# Patient Record
Sex: MALE | Race: WHITE | NOT HISPANIC OR LATINO | URBAN - METROPOLITAN AREA
[De-identification: names, ages, dates, MRNs, and addresses within clinical notes are randomized per-mention and may not be internally consistent; named-entity substitution may affect disease eponyms.]

---

## 2018-06-20 ENCOUNTER — NEW PATIENT (OUTPATIENT)
Dept: URBAN - METROPOLITAN AREA CLINIC 64 | Facility: CLINIC | Age: 78
End: 2018-06-20
Payer: COMMERCIAL

## 2018-06-20 PROCEDURE — 92004 COMPRE OPH EXAM NEW PT 1/>: CPT | Performed by: OPHTHALMOLOGY

## 2018-06-20 ASSESSMENT — INTRAOCULAR PRESSURE
OS: 18
OD: 13

## 2018-06-20 ASSESSMENT — KERATOMETRY
OS: 43.44
OD: 42.73

## 2018-07-16 ENCOUNTER — FOLLOW UP ESTABLISHED (OUTPATIENT)
Dept: URBAN - METROPOLITAN AREA CLINIC 64 | Facility: CLINIC | Age: 78
End: 2018-07-16
Payer: COMMERCIAL

## 2018-07-16 DIAGNOSIS — H26.493 OTHER SECONDARY CATARACT, BILATERAL: ICD-10-CM

## 2018-07-16 PROCEDURE — 92012 INTRM OPH EXAM EST PATIENT: CPT | Performed by: OPHTHALMOLOGY

## 2018-07-16 ASSESSMENT — INTRAOCULAR PRESSURE
OS: 16
OD: 16

## 2018-07-16 ASSESSMENT — VISUAL ACUITY
OD: 20/20
OS: 20/20

## 2018-10-15 ENCOUNTER — FOLLOW UP ESTABLISHED (OUTPATIENT)
Dept: URBAN - METROPOLITAN AREA CLINIC 64 | Facility: CLINIC | Age: 78
End: 2018-10-15
Payer: COMMERCIAL

## 2018-10-15 PROCEDURE — 99213 OFFICE O/P EST LOW 20 MIN: CPT | Performed by: OPHTHALMOLOGY

## 2018-10-15 ASSESSMENT — VISUAL ACUITY
OD: 20/20
OS: 20/20

## 2018-10-15 ASSESSMENT — INTRAOCULAR PRESSURE
OD: 16
OS: 18

## 2019-10-14 ENCOUNTER — FOLLOW UP ESTABLISHED (OUTPATIENT)
Dept: URBAN - METROPOLITAN AREA CLINIC 64 | Facility: CLINIC | Age: 79
End: 2019-10-14
Payer: COMMERCIAL

## 2019-10-14 PROCEDURE — 92014 COMPRE OPH EXAM EST PT 1/>: CPT | Performed by: OPHTHALMOLOGY

## 2019-10-14 ASSESSMENT — KERATOMETRY
OS: 43.41
OD: 42.83

## 2019-10-14 ASSESSMENT — VISUAL ACUITY
OD: 20/25
OS: 20/20

## 2019-10-14 ASSESSMENT — INTRAOCULAR PRESSURE
OD: 14
OS: 15

## 2019-11-15 ENCOUNTER — SURGERY (OUTPATIENT)
Dept: URBAN - METROPOLITAN AREA SURGERY 42 | Facility: SURGERY | Age: 79
End: 2019-11-15
Payer: COMMERCIAL

## 2019-11-15 PROCEDURE — 66821 AFTER CATARACT LASER SURGERY: CPT | Performed by: OPHTHALMOLOGY

## 2019-11-25 ENCOUNTER — POST OP (OUTPATIENT)
Dept: URBAN - METROPOLITAN AREA CLINIC 64 | Facility: CLINIC | Age: 79
End: 2019-11-25

## 2019-11-25 PROCEDURE — 99024 POSTOP FOLLOW-UP VISIT: CPT | Performed by: OPTOMETRIST

## 2019-11-25 ASSESSMENT — VISUAL ACUITY
OS: 20/25
OD: 20/20

## 2019-11-25 ASSESSMENT — INTRAOCULAR PRESSURE
OS: 17
OD: 15

## 2019-12-12 ENCOUNTER — SURGERY (OUTPATIENT)
Dept: URBAN - METROPOLITAN AREA SURGERY 42 | Facility: SURGERY | Age: 79
End: 2019-12-12
Payer: COMMERCIAL

## 2019-12-12 PROCEDURE — 66821 AFTER CATARACT LASER SURGERY: CPT | Performed by: OPHTHALMOLOGY

## 2019-12-16 ENCOUNTER — POST OP (OUTPATIENT)
Dept: URBAN - METROPOLITAN AREA CLINIC 64 | Facility: CLINIC | Age: 79
End: 2019-12-16

## 2019-12-16 PROCEDURE — 99024 POSTOP FOLLOW-UP VISIT: CPT | Performed by: OPTOMETRIST

## 2019-12-16 ASSESSMENT — VISUAL ACUITY
OD: 20/20
OS: 20/25

## 2019-12-16 ASSESSMENT — INTRAOCULAR PRESSURE
OD: 12
OS: 13

## 2021-12-29 ENCOUNTER — OFFICE VISIT (OUTPATIENT)
Dept: URBAN - METROPOLITAN AREA CLINIC 64 | Facility: CLINIC | Age: 81
End: 2021-12-29
Payer: MEDICARE

## 2021-12-29 DIAGNOSIS — Z96.1 PRESENCE OF INTRAOCULAR LENS: ICD-10-CM

## 2021-12-29 DIAGNOSIS — H43.391 OTHER VITREOUS OPACITIES, RIGHT EYE: Primary | ICD-10-CM

## 2021-12-29 DIAGNOSIS — H04.562 STENOSIS OF LEFT LACRIMAL PUNCTUM: ICD-10-CM

## 2021-12-29 PROCEDURE — 92014 COMPRE OPH EXAM EST PT 1/>: CPT | Performed by: OPTOMETRIST

## 2021-12-29 PROCEDURE — 68801 DILATE TEAR DUCT OPENING: CPT | Performed by: OPTOMETRIST

## 2021-12-29 ASSESSMENT — VISUAL ACUITY
OD: 20/20
OS: 20/20

## 2021-12-29 ASSESSMENT — KERATOMETRY
OD: 42.75
OS: 43.88

## 2021-12-29 ASSESSMENT — INTRAOCULAR PRESSURE
OD: 11
OS: 15

## 2021-12-29 NOTE — IMPRESSION/PLAN
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 Plan: Occasional epiphora OS the last few months. No symptoms OU. Mild dry eye, equal OU on exam today. D/I OS today. First attempt no passage. Second attempt with increased pressure + passage. May need probing with Dr. Thi Cavazos if symptoms persist.  He will try systane tid OS and RTC with Dr. Thi Cavazos if tearing persists OS.

## 2021-12-29 NOTE — IMPRESSION/PLAN
Impression: Other vitreous opacities, right eye: H43.391. Plan: Recent PVD w/ crescent shaped floaters for a few days, now resolved. Pt. ed. on findings. Ok to observe.

## 2021-12-29 NOTE — IMPRESSION/PLAN
Impression: Presence of intraocular lens: Z96.1. Plan: Clear PCIOL OU s/p YAG OU. No further tx needed.

## 2023-02-17 ENCOUNTER — OFFICE VISIT (OUTPATIENT)
Dept: URBAN - METROPOLITAN AREA CLINIC 64 | Facility: CLINIC | Age: 83
End: 2023-02-17
Payer: COMMERCIAL

## 2023-02-17 DIAGNOSIS — H02.135 SENILE ECTROPION OF LEFT LOWER LID: ICD-10-CM

## 2023-02-17 DIAGNOSIS — H04.563 STENOSIS OF BILATERAL LACRIMAL PUNCTUM: ICD-10-CM

## 2023-02-17 DIAGNOSIS — H16.223 KERATOCONJUNCTIVITIS SICCA, BILATERAL: ICD-10-CM

## 2023-02-17 DIAGNOSIS — H02.132 SENILE ECTROPION OF RIGHT LOWER LID: Primary | ICD-10-CM

## 2023-02-17 PROCEDURE — 99214 OFFICE O/P EST MOD 30 MIN: CPT

## 2023-02-17 NOTE — IMPRESSION/PLAN
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 Plan: Mild dry eye, equal OU on exam today. D/I BUL today. Passage with first attempt OD. No passage OS today with multiple attempts. Recommend irrigation and probing with Dr. Clay Webster  or Dr. Luanne Hu.

## 2023-02-17 NOTE — IMPRESSION/PLAN
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 Plan: Discussed may be contributing to dryness. Recommend consult with plastics.